# Patient Record
Sex: MALE | Race: WHITE | NOT HISPANIC OR LATINO | URBAN - NONMETROPOLITAN AREA
[De-identification: names, ages, dates, MRNs, and addresses within clinical notes are randomized per-mention and may not be internally consistent; named-entity substitution may affect disease eponyms.]

---

## 2019-01-03 NOTE — PATIENT DISCUSSION
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 Patient is symptomatic with visual function affected. (+) current methylprednisolone use and Hx of cortisone injections. Advised that quick progression of cataract is very likely associated with recent steroid use. - Plan : Risks, Benefits and Alternatives were discussed with patient at length for Cataract Surgery. Visual symptoms are consistent with Cataract findings on examination and current refraction no longer provides satisfactory vision. Minimal astigmatism OU and likely does not warrant toric lens. Discussed options of MF/EROF lenses. Patient has tried monovision in the past without success. Patient does not mind wearing glasses for reading. Patient understands and desires surgery. All questions answered. Risks, Benefits and Alternatives discussed at length for IOL placement. Patient will need to wear glasses for TBD.  EYE: OS IOL TYPE: TBD POST OPERATIVE TARGET:   RECOMMENDED PACKAGE:   PT PREFERRED PACKAGE:   OD to follow. Patient to see surgery counselor today.

## 2019-01-03 NOTE — PATIENT DISCUSSION
(H25.043) Posterior subcapsular polar age-related cataract, bilateral - Assesment : Examination revealed Posterior Subcapsular Polar Senile Cataract. (+) current methylprednisolone use. - Plan : see plan #1.

## 2019-01-17 NOTE — PATIENT DISCUSSION
(H35.527) Drusen (degenerative) of macula, bilateral - Assesment : Examination revealed rare Drusen OU. - Plan : Monitor for changes. Advised patient to call our office with decreased vision or increased symptoms.

## 2019-01-17 NOTE — PATIENT DISCUSSION
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 Patient is symptomatic with visual function affected. (+) current methylprednisolone use and Hx of cortisone injections. Advised that quick progression of cataract is very likely associated with recent steroid use. - Plan : Risks, Benefits and Alternatives were discussed with patient at length for Cataract Surgery. Visual symptoms are consistent with Cataract findings on examination and current refraction no longer provides satisfactory vision. Discussed options of MF/EROF lenses and discussed the possibility to need readers in dim lighting or for finer print. Patient does many tasks up close, computer work, cooking. Patient is currently nearsighted. Patient has tried monovision in the past without success. Patient would like to be mostly independent of glasses. Minimal astigmatism OU and does not warrant toric lens. Discussed larger then average axial length of eyes and strongly recommend ORA measurement at time of surgery. Patient understands and desires surgery. All questions answered. Risks, Benefits and Alternatives discussed at length for IOL placement. Patient will need to wear glasses for reading dim lighting or for finer print. EYE: OS IOL TYPE: Restor lens w/ High add   POST OPERATIVE TARGET: Yany RACHEL RECOMMENDED PACKAGE:  MF pkge  PT PREFERRED PACKAGE:  MF pkge  OD to follow w/Restor lens w/low add. Patient to see surgery counselor today.

## 2019-01-17 NOTE — PATIENT DISCUSSION
(J78.552) Keratoconjunct sicca, not specified as Sjogren's, bilateral - Assesment : Examination revealed Dry Eye Syndrome OU. - Plan : Monitor for changes. Advised patient to call our office with decreased vision or increased symptoms.

## 2019-02-07 NOTE — PATIENT DISCUSSION
(Z96.1) Presence of intraocular lens - Assesment : Patient is Pseudophakic. ORA done in the OR - Plan : Discussed signs and symptoms of infection and retinal detachments. Do not rub operated eye. Follow drop schedule If redness,pain,decreased vision, flashes or floaters occur then contact clinic.

## 2019-02-12 NOTE — PATIENT DISCUSSION
(Z96.1) Presence of intraocular lens - Assesment : Patient is Pseudophakic. Normal post op appearance - Plan : Discussed signs and symptoms of infection and retinal detachments. Do not rub operated eye. Follow drop schedule If redness,pain,decreased vision, flashes or floaters occur then contact clinic.

## 2019-02-12 NOTE — PATIENT DISCUSSION
(H25.011) Cortical age-related cataract, right eye - Assesment : Examination revealed Cortical Senile Cataract. Patient is symptomatic with visual function affected. (+) current methylprednisolone use and Hx of cortisone injections. Advised that quick progression of cataract is very likely associated with recent steroid use. - Plan : Risks, Benefits and Alternatives were discussed with patient at length for Cataract Surgery. Visual symptoms are consistent with Cataract findings on examination and current refraction no longer provides satisfactory vision. Discussed options of MF/EROF lenses and discussed the possibility to need readers in dim lighting or for finer print. Patient does many tasks up close, computer work, cooking. Patient is currently nearsighted. Patient has tried monovision in the past without success. Patient would like to be mostly independent of glasses. Minimal astigmatism OU and does not warrant toric lens. Discussed larger then average axial length of eyes and strongly recommend ORA measurement at time of surgery. Patient understands and desires surgery. All questions answered. Risks, Benefits and Alternatives discussed at length for IOL placement. Patient will need to wear glasses for reading dim lighting or for finer print. EYE: OD IOL TYPE: Restor lens w/ low add POST OPERATIVE TARGET: Yany RACHEL RECOMMENDED PACKAGE:  MF pkge  PT PREFERRED PACKAGE:  MF pkge   Patient to see surgery counselor today.

## 2019-02-26 NOTE — PATIENT DISCUSSION
(Z96.1) Presence of intraocular lens - Assesment : Patient is Pseudophakic. Healing nicely and lens is well centered. Patient will need to give it time for the eyes to work together with these lenses. - Plan : Discussed signs and symptoms of infection and retinal detachments. Do not rub operated eye.  Follow drop schedule If redness,pain,decreased vision, flashes or floaters occur then contact clinic.  3 WEEKS REFRACTION/MAC OCT

## 2019-03-22 NOTE — PATIENT DISCUSSION
(Z96.1) Presence of intraocular lens - Assesment : Patient is Pseudophakic. Patient is seeing very well without correction. No glasses Rx  warranted at this time. NO CME noted on OCT mac today. - Plan : Signs and symptoms of infection and retinal detachment are outlined in your surgical packet. Do not rub operated eye. Follow drop schedule. If redness, pain, decreased vision, flashes or floaters occur then contact clinic. Advised mto use +1.00/1.25 OTC readers when needed.   RTC 1 year/EXAM

## 2019-03-22 NOTE — PATIENT DISCUSSION
(H35.363) Drusen (degenerative) of macula, bilateral - Assesment : Examination revealed rare Drusen OU. Confirmed by OCT mac today. - Plan : Monitor for changes. Advised patient to call our office with decreased vision or increased symptoms.

## 2019-04-03 ENCOUNTER — IMPORTED ENCOUNTER (OUTPATIENT)
Dept: URBAN - NONMETROPOLITAN AREA CLINIC 1 | Facility: CLINIC | Age: 84
End: 2019-04-03

## 2019-04-03 PROBLEM — H52.4: Noted: 2019-04-03

## 2019-04-03 PROBLEM — Z96.1: Noted: 2019-04-03

## 2019-04-03 PROBLEM — H40.003: Noted: 2019-04-03

## 2019-04-03 PROBLEM — H35.363: Noted: 2019-04-03

## 2019-04-03 PROBLEM — E11.9: Noted: 2019-04-03

## 2019-04-03 PROBLEM — H26.493: Noted: 2019-04-03

## 2019-04-03 PROBLEM — H52.223: Noted: 2019-04-03

## 2019-04-03 PROCEDURE — 92015 DETERMINE REFRACTIVE STATE: CPT

## 2019-04-03 PROCEDURE — 92014 COMPRE OPH EXAM EST PT 1/>: CPT

## 2019-04-03 NOTE — PATIENT DISCUSSION
DM s DRType 2 DM - 2011Last A1C - pt not sure but was below 7% and was done about 6 mo ago. -Stressed the importance of keeping blood sugars under control and regular visits with PCP. -Explained the possible effects of poorly controlled diabetes and the damage that diabetes can cause to ocular health. -Pt instructed to contact our office with any vision changes. s/p PCIOL-Stable PCIOL OU.-Monitor for PCO. Early PCO-Explained symptoms of advancing PCO. -Continue to monitor for now. Pt will notify us if any new symptoms develop. DRUSEN MACULA:.-Discussed findings of exam in detail with the patient.-Discussed the chronic nature of this disease and limited treatment options. Glaucoma Suspect-Based on cupping/ disc asym. -Appears stable at this time.-Continue to monitor with exams and testing. Letter to Attentio; 's Notes: PT HAD CAROTID DOPPLER - 50% BLOCKED OK PER CARDIO AT THIS TIME.

## 2022-04-10 ASSESSMENT — VISUAL ACUITY
OD_SC: 20/25
OS_SC: 20/40

## 2022-04-10 ASSESSMENT — TONOMETRY
OD_IOP_MMHG: 17
OS_IOP_MMHG: 17